# Patient Record
Sex: FEMALE | Race: BLACK OR AFRICAN AMERICAN | NOT HISPANIC OR LATINO
[De-identification: names, ages, dates, MRNs, and addresses within clinical notes are randomized per-mention and may not be internally consistent; named-entity substitution may affect disease eponyms.]

---

## 2017-06-30 ENCOUNTER — TRANSCRIPTION ENCOUNTER (OUTPATIENT)
Age: 22
End: 2017-06-30

## 2019-03-01 ENCOUNTER — TRANSCRIPTION ENCOUNTER (OUTPATIENT)
Age: 24
End: 2019-03-01

## 2020-08-03 ENCOUNTER — APPOINTMENT (OUTPATIENT)
Dept: DERMATOLOGY | Facility: CLINIC | Age: 25
End: 2020-08-03
Payer: COMMERCIAL

## 2020-08-03 DIAGNOSIS — L30.5 PITYRIASIS ALBA: ICD-10-CM

## 2020-08-03 PROBLEM — Z00.00 ENCOUNTER FOR PREVENTIVE HEALTH EXAMINATION: Status: ACTIVE | Noted: 2020-08-03

## 2020-08-03 PROCEDURE — 99202 OFFICE O/P NEW SF 15 MIN: CPT

## 2020-08-03 RX ORDER — NORETHINDRONE 0.35 MG/1
TABLET ORAL
Refills: 0 | Status: ACTIVE | COMMUNITY

## 2020-08-03 RX ORDER — HYDROCORTISONE 25 MG/G
2.5 OINTMENT TOPICAL
Qty: 2 | Refills: 2 | Status: ACTIVE | COMMUNITY
Start: 2020-08-03 | End: 1900-01-01

## 2020-08-03 NOTE — PHYSICAL EXAM
[Alert] : alert [Oriented x 3] : ~L oriented x 3 [Well Nourished] : well nourished [FreeTextEntry3] : Type V skin\par \par Face: Moderate faint hypopigmented slightly scaly patches present, especially on the cheeks\par Few on forehead\par Note: Moderate papules and comedones as well\par Arms are unaffected\par \par Note: Photo on patient's phone shows large pink scaly patches on the cheeks-taken one year ago\par

## 2020-08-03 NOTE — HISTORY OF PRESENT ILLNESS
[FreeTextEntry1] : Rash on face [de-identified] : First visit for 25-year-old female long history of mildly itchy "dry patchy" rash on the face, especially in the summer.  Self treated with sunscreen without improvement.

## 2020-09-03 ENCOUNTER — APPOINTMENT (OUTPATIENT)
Dept: DERMATOLOGY | Facility: CLINIC | Age: 25
End: 2020-09-03

## 2021-06-14 ENCOUNTER — TRANSCRIPTION ENCOUNTER (OUTPATIENT)
Age: 26
End: 2021-06-14

## 2021-12-09 ENCOUNTER — APPOINTMENT (OUTPATIENT)
Dept: ORTHOPEDIC SURGERY | Facility: CLINIC | Age: 26
End: 2021-12-09
Payer: COMMERCIAL

## 2021-12-09 ENCOUNTER — TRANSCRIPTION ENCOUNTER (OUTPATIENT)
Age: 26
End: 2021-12-09

## 2021-12-09 VITALS
HEIGHT: 66 IN | WEIGHT: 210 LBS | BODY MASS INDEX: 33.75 KG/M2 | SYSTOLIC BLOOD PRESSURE: 129 MMHG | TEMPERATURE: 98.1 F | HEART RATE: 71 BPM | DIASTOLIC BLOOD PRESSURE: 73 MMHG

## 2021-12-09 DIAGNOSIS — Z78.9 OTHER SPECIFIED HEALTH STATUS: ICD-10-CM

## 2021-12-09 DIAGNOSIS — Z84.1 FAMILY HISTORY OF DISORDERS OF KIDNEY AND URETER: ICD-10-CM

## 2021-12-09 DIAGNOSIS — M54.50 LOW BACK PAIN, UNSPECIFIED: ICD-10-CM

## 2021-12-09 DIAGNOSIS — Z82.49 FAMILY HISTORY OF ISCHEMIC HEART DISEASE AND OTHER DISEASES OF THE CIRCULATORY SYSTEM: ICD-10-CM

## 2021-12-09 PROCEDURE — 72220 X-RAY EXAM SACRUM TAILBONE: CPT

## 2021-12-09 PROCEDURE — 99204 OFFICE O/P NEW MOD 45 MIN: CPT

## 2021-12-09 RX ORDER — MELOXICAM 15 MG/1
15 TABLET ORAL
Qty: 30 | Refills: 2 | Status: ACTIVE | COMMUNITY
Start: 2021-12-09 | End: 1900-01-01

## 2021-12-10 PROBLEM — M54.50 LUMBOSACRAL PAIN: Status: ACTIVE | Noted: 2021-12-09

## 2021-12-10 NOTE — HISTORY OF PRESENT ILLNESS
[de-identified] : 12/09/2021: Patient is a 26 year-old male who presents to the office today for initial evaluation of lower back/sacral pain, particularly in the tailbone. She presents for her visit today with her mother. The pain has been present since April/May. It comes and goes and is triggered by doing situps. She denies any trauma to the coccyx. She notes if she sits on it wrong or hits it, it feels worse. The pain is mostly over the coccyx/upper crease of the buttocks and she describes the pain as achy in nature. She states that she "feels like there is something in there." She has taken Ibuprofen and Tylenol which do help. She notes she takes these as much as needed if it is acting up. She notes that the pain can go away for months at a time. She works as a  and notes that sitting for long periods of time is uncomfortable. She denies paresthesias in the lower extremities. She denies radiation of pain. She denies bowel or bladder dysfunction. She denies any saddle anesthesia. \par \par The patient's past medical history, past surgical history, medications and allergies were reviewed by me today and documented accordingly. In addition, the patient's family and social history, which were non-contributory to this visit, were also reviewed. Intake form was reviewed.\par

## 2021-12-10 NOTE — PHYSICAL EXAM
[Normal Finger/nose] : finger to nose coordination [Normal] : Oriented to person, place, and time, insight and judgement were intact and the affect was normal [Poor Appearance] : well-appearing [Acute Distress] : not in acute distress [de-identified] : Gait Steady \par \par Spine \par No bony tenderness, no step-offs, no swelling.\par \par Cervical  ROM \par Flexion 50 degrees\par Extension 60 Degrees \par Rotation 80 degrees\par Lateral bend 45 degrees\par \par Spurlings Test Negative\par \par Upper Extremities - Reflexes 2 +, \par Strength\par Shoulder Abduction - 5/5\par Elbow Flexion 5/5\par Elbow Extension 5/5\par Wrist Flexion 5/5\par Wrist Extension 5/5\par  strength 5/5\par Finger Abduction 5/5\par Hirsch's test negative \par Sensation intact and equal to light touch bilaterally\par Distal pulses intact \par \par Lumbar ROM\par Flexion 60 degrees\par Extension 25 degrees\par Lateral bend 25 degrees \par Small subcutaneous mass, measuring approximately 1cm x 2cm is palpated in the proximal-most left side of the gluteal cleft. It is firm and tender to palpation. No drainage or skin breakdown is noted. It is subtly mobile. It is palpable only and not visually seen on examination, thus measurements are not entirely precise. No warmth, erythema or ecchymosis is noted in the questioned region. There is no tenderness of the actual coccyx. \par Lower Extremities Reflexes 2 +\par Strength \par Hip flexion 5/5\par Knee Extension 5/5\par Dorsi/Plantar flexion 5/5\par EHL 5/5\par Clonus Negative \par Babinski Negative \par SLR - Negative \par Sensation intact and equal to light touch bilaterally\par Distal Pulses intact\par \par  [de-identified] : 2 xray views of the coccyx were obtained. No fractures or dislocations are noted. No foreign bodies are noted. Normal coccyx radiographs.

## 2021-12-10 NOTE — ASSESSMENT
[FreeTextEntry1] : Patient with sacral/gluteal cleft pain. There is no apparent orthopaedic etiology for the patient's symptoms as she has negative xrays of the coccyx (the region in question originally) and she has a completely normal neurological and orthopaedic exam. What does stand out, however, is a small, approximately 1cm x 2cm palpable, firm lesion in the proximal-most upper left aspect of the gluteal cleft. Differential includes but is not limited to soft tissue mass vs. pilonidal cyst. Pilonidal cyst is considered secondary to the location of the lesion, however, is less likely as there is no drainage ever, the patient is afebrile without chills, and the skin is fully intact with no erythema or warmth. More likely is possibly a lipoma. Since there does not appear to be an orthopaedic etiology for her symptoms, I recommended to the patient and her mother, that she seek the treatment of a general surgeon for further treatment options of her symptoms. I will send the patient a prescription of Meloxicam to aid in pain-control and possibly decrease the size of the palpable mass (however, the latter is unlikely). The patient will follow back up as needed for any orthopaedic issues. The patient and her mother are in agreement with this plan and very appreciative of her care, noting thoroughness of the history and exam and giving thanks for the surgical referral. 50 minutes of time was utilized to obtain a thorough patient history, obtain and interpret proper radiographic imaging, perform a detailed physical examination, discuss the patient's diagnosis at length, inform the patient about the different treatment plans for their condition, and answer all questions the patient and her mother had regarding their visit.\par \par Of note, the patient presented with her mother tonight, who was present for the entire visit, including the physical examination which included an appropriate examination of the patient's proximal-most aspect of the gluteal cleft.\par

## 2021-12-22 ENCOUNTER — APPOINTMENT (OUTPATIENT)
Dept: COLORECTAL SURGERY | Facility: CLINIC | Age: 26
End: 2021-12-22
Payer: COMMERCIAL

## 2021-12-22 PROCEDURE — 99203 OFFICE O/P NEW LOW 30 MIN: CPT | Mod: 95

## 2021-12-30 ENCOUNTER — APPOINTMENT (OUTPATIENT)
Dept: COLORECTAL SURGERY | Facility: CLINIC | Age: 26
End: 2021-12-30
Payer: COMMERCIAL

## 2021-12-30 DIAGNOSIS — R22.9 LOCALIZED SWELLING, MASS AND LUMP, UNSPECIFIED: ICD-10-CM

## 2021-12-30 DIAGNOSIS — L05.91 PILONIDAL CYST W/OUT ABSCESS: ICD-10-CM

## 2021-12-30 PROCEDURE — 99213 OFFICE O/P EST LOW 20 MIN: CPT

## 2021-12-30 PROCEDURE — 99203 OFFICE O/P NEW LOW 30 MIN: CPT

## 2021-12-30 NOTE — PHYSICAL EXAM
[Abdomen Masses] : No abdominal masses [Abdomen Tenderness] : ~T No ~M abdominal tenderness [Normal rectal exam] : exam was normal [Excoriation] : no perianal excoriation [Multiple Sinus Tracts] : no perianal sinus tracts [Fistula] : no fistulas [Wart] : no warts [Pilonidal Cyst] : no pilonidal cysts [Pilonidal Sinus] : no pilonidal sinus [Pilonidal Sinus Draining] : no pilonidal sinus drainage [Tender, Swollen] : nontender, non-swollen [Normal] : was normal [None] : there was no rectal abscess [Alert] : alert [Oriented to Person] : oriented to person [Oriented to Place] : oriented to place [Oriented to Time] : oriented to time [Calm] : calm [de-identified] : JASPAL - Non-tender, no palpable mass, no enlarged hemorrhoids [de-identified] : NAD, healthy appearing adult female [de-identified] : Non-labored respirations [de-identified] : Normal rate [FreeTextEntry1] : No palpable masses on examination, patient points out tailbone but does report that what shes able to feel is not swollen at this time. No other findings appreciated. No overlying skin changes, no erythema or tenderness on exam. No signs of pilonidal cyst or sinus

## 2021-12-30 NOTE — REASON FOR VISIT
[Initial Evaluation] : an initial evaluation [FreeTextEntry1] : Soft tissue mass - Intergluteal cleft overlying the sacrum

## 2021-12-30 NOTE — HISTORY OF PRESENT ILLNESS
[FreeTextEntry1] : 26 year F with no significant PMH who was referred for evaluation of soft tissue finding overlying the sacrum/coccyx. Patient reports 7 months of intermittent pain usually after minor trauma likely working out and doing situ ups or bumping that area. She denies any obvious sings of infection, bleeding, no drainage. Patient denies changes in bowel habits, denies weight loss. Patient denies N/V/F/C. Patient was recently seen by Orthopedics on who's evaluation determined it was a sacral/gluteal soft tissue mass vs. pilonidal cyst based on the location. She was prescribed Meloxicam for her pain which she reports provided no relief.\par Patient denies history of IBD, Cancer.\par No prior colonoscopy

## 2021-12-30 NOTE — ASSESSMENT
[FreeTextEntry1] : 26 year old female referred for evaluation of soft tissue finding overlying the sacrum/coccyx. No obvious findings on physical evaluation. Patient reports that she has intermittent swelling overlying the area and it causing intermittent pain. We will obtain an MRI to rule out underlying mass.\par \par

## 2022-01-17 ENCOUNTER — TRANSCRIPTION ENCOUNTER (OUTPATIENT)
Age: 27
End: 2022-01-17

## 2022-12-15 ENCOUNTER — NON-APPOINTMENT (OUTPATIENT)
Age: 27
End: 2022-12-15